# Patient Record
Sex: FEMALE | Race: WHITE | NOT HISPANIC OR LATINO | URBAN - METROPOLITAN AREA
[De-identification: names, ages, dates, MRNs, and addresses within clinical notes are randomized per-mention and may not be internally consistent; named-entity substitution may affect disease eponyms.]

---

## 2017-04-06 ENCOUNTER — GENERIC CONVERSION - ENCOUNTER (OUTPATIENT)
Dept: OTHER | Facility: OTHER | Age: 72
End: 2017-04-06

## 2017-06-15 ENCOUNTER — GENERIC CONVERSION - ENCOUNTER (OUTPATIENT)
Dept: OTHER | Facility: OTHER | Age: 72
End: 2017-06-15

## 2017-08-17 ENCOUNTER — RX ONLY (RX ONLY)
Age: 72
End: 2017-08-17

## 2017-08-17 ENCOUNTER — DOCTOR'S OFFICE (OUTPATIENT)
Dept: URBAN - METROPOLITAN AREA CLINIC 136 | Facility: CLINIC | Age: 72
Setting detail: OPHTHALMOLOGY
End: 2017-08-17
Payer: COMMERCIAL

## 2017-08-17 DIAGNOSIS — H01.001: ICD-10-CM

## 2017-08-17 DIAGNOSIS — H25.12: ICD-10-CM

## 2017-08-17 DIAGNOSIS — H10.13: ICD-10-CM

## 2017-08-17 DIAGNOSIS — H01.004: ICD-10-CM

## 2017-08-17 DIAGNOSIS — H16.221: ICD-10-CM

## 2017-08-17 DIAGNOSIS — H04.222: ICD-10-CM

## 2017-08-17 DIAGNOSIS — H16.222: ICD-10-CM

## 2017-08-17 PROCEDURE — 99203 OFFICE O/P NEW LOW 30 MIN: CPT | Performed by: OPHTHALMOLOGY

## 2017-08-17 ASSESSMENT — REFRACTION_MANIFEST
OS_VA1: 20/
OD_VA3: 20/
OD_VA2: 20/
OD_VA1: 20/
OS_VA2: 20/
OD_VA2: 20/
OD_VA3: 20/
OS_VA1: 20/
OD_VA1: 20/
OS_VA1: 20/
OS_VA3: 20/
OS_VA2: 20/
OU_VA: 20/
OS_VA2: 20/
OS_VA3: 20/
OU_VA: 20/
OD_VA2: 20/
OS_VA3: 20/
OD_VA1: 20/
OU_VA: 20/
OD_VA3: 20/

## 2017-08-17 ASSESSMENT — LID EXAM ASSESSMENTS
OS_BLEPHARITIS: 1+
OD_BLEPHARITIS: 1+

## 2017-08-17 ASSESSMENT — REFRACTION_CURRENTRX
OD_OVR_VA: 20/
OD_VPRISM_DIRECTION: PROGS
OS_CYLINDER: -0.50
OD_OVR_VA: 20/
OD_ADD: +2.25
OS_VPRISM_DIRECTION: PROGS
OS_AXIS: 118
OS_ADD: +2.25
OS_OVR_VA: 20/
OD_AXIS: 082
OD_CYLINDER: -0.50
OS_OVR_VA: 20/
OD_SPHERE: -0.50
OS_OVR_VA: 20/
OD_OVR_VA: 20/
OS_SPHERE: -1.00

## 2017-08-17 ASSESSMENT — VISUAL ACUITY
OS_BCVA: 20/30-1
OD_BCVA: 20/30-1

## 2017-08-17 ASSESSMENT — SUPERFICIAL PUNCTATE KERATITIS (SPK)
OD_SPK: ABSENT
OS_SPK: T

## 2017-08-17 ASSESSMENT — CONFRONTATIONAL VISUAL FIELD TEST (CVF)
OS_FINDINGS: FULL
OD_FINDINGS: FULL

## 2017-08-17 ASSESSMENT — INCREASING TEAR LAKE - SEVERITY SCORE: OS_INC_TEARLAKE: 1+

## 2017-09-05 ENCOUNTER — AMBUL SURGICAL CARE (OUTPATIENT)
Dept: URBAN - METROPOLITAN AREA SURGERY 32 | Facility: SURGERY | Age: 72
Setting detail: OPHTHALMOLOGY
End: 2017-09-05
Payer: COMMERCIAL

## 2017-09-05 DIAGNOSIS — H04.212: ICD-10-CM

## 2017-09-05 PROCEDURE — G8907 PT DOC NO EVENTS ON DISCHARG: HCPCS | Performed by: OPHTHALMOLOGY

## 2017-09-05 PROCEDURE — 68840 EXPLORE/IRRIGATE TEAR DUCTS: CPT | Performed by: OPHTHALMOLOGY

## 2017-09-05 PROCEDURE — G8918 PT W/O PREOP ORDER IV AB PRO: HCPCS | Performed by: OPHTHALMOLOGY

## 2017-09-05 PROCEDURE — 68810 PROBE NASOLACRIMAL DUCT: CPT | Performed by: OPHTHALMOLOGY

## 2017-09-12 ENCOUNTER — RX ONLY (RX ONLY)
Age: 72
End: 2017-09-12

## 2017-09-12 ENCOUNTER — DOCTOR'S OFFICE (OUTPATIENT)
Dept: URBAN - METROPOLITAN AREA CLINIC 136 | Facility: CLINIC | Age: 72
Setting detail: OPHTHALMOLOGY
End: 2017-09-12
Payer: COMMERCIAL

## 2017-09-12 DIAGNOSIS — Z96.1: ICD-10-CM

## 2017-09-12 DIAGNOSIS — H16.221: ICD-10-CM

## 2017-09-12 DIAGNOSIS — H04.222: ICD-10-CM

## 2017-09-12 DIAGNOSIS — H25.012: ICD-10-CM

## 2017-09-12 DIAGNOSIS — H01.001: ICD-10-CM

## 2017-09-12 DIAGNOSIS — H10.13: ICD-10-CM

## 2017-09-12 DIAGNOSIS — H26.40: ICD-10-CM

## 2017-09-12 DIAGNOSIS — H01.004: ICD-10-CM

## 2017-09-12 DIAGNOSIS — H16.222: ICD-10-CM

## 2017-09-12 PROCEDURE — 92014 COMPRE OPH EXAM EST PT 1/>: CPT | Performed by: OPHTHALMOLOGY

## 2017-09-12 ASSESSMENT — CONFRONTATIONAL VISUAL FIELD TEST (CVF)
OS_FINDINGS: FULL
OD_FINDINGS: FULL

## 2017-09-12 ASSESSMENT — SUPERFICIAL PUNCTATE KERATITIS (SPK)
OS_SPK: T
OD_SPK: ABSENT

## 2017-09-12 ASSESSMENT — LID EXAM ASSESSMENTS
OD_BLEPHARITIS: 1+
OS_BLEPHARITIS: 1+

## 2017-09-12 ASSESSMENT — INCREASING TEAR LAKE - SEVERITY SCORE: OS_INC_TEARLAKE: 1+

## 2017-09-13 ASSESSMENT — REFRACTION_MANIFEST
OS_VA3: 20/
OD_VA2: 20/
OS_VA3: 20/
OS_VA1: 20/
OS_VA2: 20/
OU_VA: 20/
OU_VA: 20/
OS_VA3: 20/
OS_VA1: 20/
OD_VA1: 20/
OD_VA3: 20/
OS_VA1: 20/
OD_VA2: 20/
OD_VA3: 20/
OU_VA: 20/
OS_VA2: 20/
OS_VA2: 20/
OD_VA3: 20/
OD_VA1: 20/
OD_VA1: 20/
OD_VA2: 20/

## 2017-09-13 ASSESSMENT — KERATOMETRY
OD_K1POWER_DIOPTERS: 45.25
OS_K2POWER_DIOPTERS: 45.00
OD_K2POWER_DIOPTERS: 44.75
OD_AXISANGLE_DEGREES: 174
OS_K1POWER_DIOPTERS: 45.00
OS_AXISANGLE_DEGREES: 169

## 2017-09-13 ASSESSMENT — REFRACTION_AUTOREFRACTION
OS_SPHERE: -2.25
OD_CYLINDER: -2.00
OS_AXIS: 155
OS_CYLINDER: -0.50
OD_AXIS: 081
OD_SPHERE: +0.22

## 2017-09-13 ASSESSMENT — SPHEQUIV_DERIVED
OD_SPHEQUIV: -0.78
OS_SPHEQUIV: -2.5

## 2017-09-13 ASSESSMENT — REFRACTION_CURRENTRX
OS_VPRISM_DIRECTION: PROGS
OD_AXIS: 082
OD_SPHERE: -0.50
OS_OVR_VA: 20/
OS_OVR_VA: 20/
OS_AXIS: 118
OD_OVR_VA: 20/
OS_SPHERE: -1.00
OD_CYLINDER: -0.50
OD_OVR_VA: 20/
OD_VPRISM_DIRECTION: PROGS
OS_CYLINDER: -0.50
OD_OVR_VA: 20/
OS_OVR_VA: 20/
OD_ADD: +2.25
OS_ADD: +2.25

## 2017-09-13 ASSESSMENT — AXIALLENGTH_DERIVED
OD_AL: 23.3474
OS_AL: 24.0227

## 2017-09-13 ASSESSMENT — VISUAL ACUITY
OS_BCVA: 20/25-1
OD_BCVA: 20/40

## 2017-09-21 ENCOUNTER — DOCTOR'S OFFICE (OUTPATIENT)
Dept: URBAN - METROPOLITAN AREA CLINIC 136 | Facility: CLINIC | Age: 72
Setting detail: OPHTHALMOLOGY
End: 2017-09-21
Payer: COMMERCIAL

## 2017-09-21 ENCOUNTER — RX ONLY (RX ONLY)
Age: 72
End: 2017-09-21

## 2017-09-21 DIAGNOSIS — H01.004: ICD-10-CM

## 2017-09-21 DIAGNOSIS — H01.001: ICD-10-CM

## 2017-09-21 DIAGNOSIS — H16.223: ICD-10-CM

## 2017-09-21 DIAGNOSIS — H25.012: ICD-10-CM

## 2017-09-21 DIAGNOSIS — H04.222: ICD-10-CM

## 2017-09-21 DIAGNOSIS — H10.13: ICD-10-CM

## 2017-09-21 PROCEDURE — 92012 INTRM OPH EXAM EST PATIENT: CPT | Performed by: OPHTHALMOLOGY

## 2017-09-21 ASSESSMENT — REFRACTION_MANIFEST
OU_VA: 20/
OS_VA2: 20/
OS_VA1: 20/
OD_VA2: 20/
OD_VA2: 20/
OS_VA2: 20/
OD_VA1: 20/
OD_VA1: 20/
OU_VA: 20/
OS_VA1: 20/
OD_VA3: 20/
OS_VA1: 20/
OD_VA2: 20/
OU_VA: 20/
OD_VA1: 20/
OS_VA3: 20/
OS_VA2: 20/
OD_VA3: 20/
OS_VA3: 20/
OD_VA3: 20/
OS_VA3: 20/

## 2017-09-21 ASSESSMENT — SPHEQUIV_DERIVED
OD_SPHEQUIV: -0.78
OS_SPHEQUIV: -2.5

## 2017-09-21 ASSESSMENT — REFRACTION_CURRENTRX
OS_OVR_VA: 20/
OD_OVR_VA: 20/
OD_OVR_VA: 20/
OS_OVR_VA: 20/
OD_OVR_VA: 20/
OS_OVR_VA: 20/

## 2017-09-21 ASSESSMENT — LID EXAM ASSESSMENTS
OD_MEIBOMITIS: 1+
OD_BLEPHARITIS: 1+
OS_BLEPHARITIS: 1+
OS_MEIBOMITIS: 1+

## 2017-09-21 ASSESSMENT — INCREASING TEAR LAKE - SEVERITY SCORE
OS_INC_TEARLAKE: ABSENT
OD_INC_TEARLAKE: ABSENT

## 2017-09-21 ASSESSMENT — CONFRONTATIONAL VISUAL FIELD TEST (CVF)
OD_FINDINGS: FULL
OS_FINDINGS: FULL

## 2017-09-21 ASSESSMENT — REFRACTION_AUTOREFRACTION
OD_AXIS: 081
OS_CYLINDER: -0.50
OD_SPHERE: +0.22
OD_CYLINDER: -2.00
OS_SPHERE: -2.25
OS_AXIS: 155

## 2017-09-21 ASSESSMENT — SUPERFICIAL PUNCTATE KERATITIS (SPK)
OS_SPK: ABSENT
OD_SPK: ABSENT

## 2017-09-21 ASSESSMENT — VISUAL ACUITY
OD_BCVA: 20/40
OS_BCVA: 20/25-1

## 2017-09-27 ENCOUNTER — DOCTOR'S OFFICE (OUTPATIENT)
Dept: URBAN - METROPOLITAN AREA CLINIC 136 | Facility: CLINIC | Age: 72
Setting detail: OPHTHALMOLOGY
End: 2017-09-27
Payer: COMMERCIAL

## 2017-09-27 DIAGNOSIS — H25.012: ICD-10-CM

## 2017-09-27 PROCEDURE — 92136 OPHTHALMIC BIOMETRY: CPT | Performed by: OPHTHALMOLOGY

## 2017-10-26 ENCOUNTER — DOCTOR'S OFFICE (OUTPATIENT)
Dept: URBAN - METROPOLITAN AREA CLINIC 136 | Facility: CLINIC | Age: 72
Setting detail: OPHTHALMOLOGY
End: 2017-10-26
Payer: COMMERCIAL

## 2017-10-26 DIAGNOSIS — H01.001: ICD-10-CM

## 2017-10-26 DIAGNOSIS — H25.012: ICD-10-CM

## 2017-10-26 DIAGNOSIS — H10.13: ICD-10-CM

## 2017-10-26 DIAGNOSIS — H16.223: ICD-10-CM

## 2017-10-26 DIAGNOSIS — H04.222: ICD-10-CM

## 2017-10-26 DIAGNOSIS — H01.004: ICD-10-CM

## 2017-10-26 PROCEDURE — 99214 OFFICE O/P EST MOD 30 MIN: CPT | Performed by: OPHTHALMOLOGY

## 2017-10-26 ASSESSMENT — CONFRONTATIONAL VISUAL FIELD TEST (CVF)
OD_FINDINGS: FULL
OS_FINDINGS: FULL

## 2017-10-26 ASSESSMENT — REFRACTION_MANIFEST
OD_VA2: 20/
OS_VA3: 20/
OD_VA3: 20/
OD_VA3: 20/
OU_VA: 20/
OS_VA1: 20/
OS_VA3: 20/
OD_VA2: 20/
OU_VA: 20/
OS_VA1: 20/
OS_VA1: 20/
OS_VA2: 20/
OS_VA3: 20/
OS_VA2: 20/
OD_VA2: 20/
OU_VA: 20/
OD_VA3: 20/
OD_VA1: 20/
OS_VA2: 20/
OD_VA1: 20/
OD_VA1: 20/

## 2017-10-26 ASSESSMENT — LID EXAM ASSESSMENTS
OS_COMMENTS: OD&GT
OS_BLEPHARITIS: 1+
OD_COMMENTS: OD&GT
OS_COMMENTS: OS
OD_BLEPHARITIS: 1+
OS_MEIBOMITIS: 1+
OD_COMMENTS: OS
OD_MEIBOMITIS: 1+

## 2017-10-26 ASSESSMENT — REFRACTION_CURRENTRX
OS_OVR_VA: 20/
OS_OVR_VA: 20/
OD_OVR_VA: 20/
OS_OVR_VA: 20/

## 2017-10-26 ASSESSMENT — REFRACTION_AUTOREFRACTION
OD_CYLINDER: -2.00
OS_CYLINDER: -0.50
OS_SPHERE: -2.25
OD_SPHERE: +0.22
OD_AXIS: 081
OS_AXIS: 155

## 2017-10-26 ASSESSMENT — SPHEQUIV_DERIVED
OS_SPHEQUIV: -2.5
OD_SPHEQUIV: -0.78

## 2017-10-26 ASSESSMENT — INCREASING TEAR LAKE - SEVERITY SCORE
OS_INC_TEARLAKE: T
OD_INC_TEARLAKE: T

## 2017-10-26 ASSESSMENT — VISUAL ACUITY
OD_BCVA: 20/40
OS_BCVA: 20/25-1

## 2017-10-26 ASSESSMENT — SUPERFICIAL PUNCTATE KERATITIS (SPK)
OD_SPK: 1+
OS_SPK: 1+

## 2017-11-01 ENCOUNTER — AMBUL SURGICAL CARE (OUTPATIENT)
Dept: URBAN - METROPOLITAN AREA SURGERY 32 | Facility: SURGERY | Age: 72
Setting detail: OPHTHALMOLOGY
End: 2017-11-01
Payer: COMMERCIAL

## 2017-11-01 DIAGNOSIS — H25.12: ICD-10-CM

## 2017-11-01 PROCEDURE — G8918 PT W/O PREOP ORDER IV AB PRO: HCPCS | Performed by: OPHTHALMOLOGY

## 2017-11-01 PROCEDURE — 66984 XCAPSL CTRC RMVL W/O ECP: CPT | Performed by: OPHTHALMOLOGY

## 2017-11-01 PROCEDURE — G8907 PT DOC NO EVENTS ON DISCHARG: HCPCS | Performed by: OPHTHALMOLOGY

## 2017-11-02 ENCOUNTER — RX ONLY (RX ONLY)
Age: 72
End: 2017-11-02

## 2017-11-02 ENCOUNTER — DOCTOR'S OFFICE (OUTPATIENT)
Dept: URBAN - METROPOLITAN AREA CLINIC 136 | Facility: CLINIC | Age: 72
Setting detail: OPHTHALMOLOGY
End: 2017-11-02
Payer: COMMERCIAL

## 2017-11-02 DIAGNOSIS — Z96.1: ICD-10-CM

## 2017-11-02 PROCEDURE — 99024 POSTOP FOLLOW-UP VISIT: CPT | Performed by: OPHTHALMOLOGY

## 2017-11-02 ASSESSMENT — KERATOMETRY
OD_K1POWER_DIOPTERS: 45.25
OS_AXISANGLE_DEGREES: 169
OD_K2POWER_DIOPTERS: 44.75
OS_K1POWER_DIOPTERS: 45.00
OS_K2POWER_DIOPTERS: 45.00
OD_AXISANGLE_DEGREES: 174

## 2017-11-02 ASSESSMENT — REFRACTION_MANIFEST
OS_VA3: 20/
OS_VA2: 20/
OD_VA1: 20/
OU_VA: 20/
OS_VA1: 20/
OD_VA2: 20/
OD_VA2: 20/
OD_VA3: 20/
OD_VA1: 20/
OS_VA2: 20/
OS_VA1: 20/
OS_VA1: 20/
OS_VA2: 20/
OD_VA3: 20/
OS_VA3: 20/
OD_VA1: 20/
OD_VA3: 20/
OD_VA2: 20/
OS_VA3: 20/

## 2017-11-02 ASSESSMENT — REFRACTION_AUTOREFRACTION
OS_SPHERE: -0.25
OS_AXIS: 121
OD_AXIS: 081
OD_CYLINDER: -2.25
OS_CYLINDER: -0.25
OD_SPHERE: +0.75

## 2017-11-02 ASSESSMENT — LID EXAM ASSESSMENTS
OD_BLEPHARITIS: 1+
OS_COMMENTS: OD&GT
OS_MEIBOMITIS: 1+
OD_COMMENTS: OD&GT
OD_COMMENTS: OS
OD_MEIBOMITIS: 1+
OS_COMMENTS: OS
OS_BLEPHARITIS: 1+

## 2017-11-02 ASSESSMENT — SUPERFICIAL PUNCTATE KERATITIS (SPK)
OD_SPK: 1+
OS_SPK: 1+

## 2017-11-02 ASSESSMENT — REFRACTION_CURRENTRX
OD_OVR_VA: 20/
OD_OVR_VA: 20/
OS_OVR_VA: 20/
OS_OVR_VA: 20/
OD_OVR_VA: 20/
OS_OVR_VA: 20/

## 2017-11-02 ASSESSMENT — AXIALLENGTH_DERIVED
OS_AL: 23.1939
OD_AL: 23.1939

## 2017-11-02 ASSESSMENT — INCREASING TEAR LAKE - SEVERITY SCORE
OD_INC_TEARLAKE: T
OS_INC_TEARLAKE: T

## 2017-11-02 ASSESSMENT — VISUAL ACUITY
OD_BCVA: 20/20-1
OS_BCVA: 20/25-1

## 2017-11-02 ASSESSMENT — SPHEQUIV_DERIVED
OD_SPHEQUIV: -0.375
OS_SPHEQUIV: -0.375

## 2017-11-08 ENCOUNTER — DOCTOR'S OFFICE (OUTPATIENT)
Dept: URBAN - METROPOLITAN AREA CLINIC 136 | Facility: CLINIC | Age: 72
Setting detail: OPHTHALMOLOGY
End: 2017-11-08

## 2017-11-08 DIAGNOSIS — Z96.1: ICD-10-CM

## 2017-11-08 DIAGNOSIS — H26.40: ICD-10-CM

## 2017-11-08 PROCEDURE — 99024 POSTOP FOLLOW-UP VISIT: CPT | Performed by: OPHTHALMOLOGY

## 2017-11-08 ASSESSMENT — KERATOMETRY
OD_AXISANGLE_DEGREES: 174
OS_K2POWER_DIOPTERS: 45.00
OS_AXISANGLE_DEGREES: 169
OS_K1POWER_DIOPTERS: 45.00
OD_K2POWER_DIOPTERS: 44.75
OD_K1POWER_DIOPTERS: 45.25

## 2017-11-08 ASSESSMENT — REFRACTION_AUTOREFRACTION
OS_CYLINDER: -0.50
OS_SPHERE: -0.50
OS_AXIS: 89
OD_CYLINDER: -2.75
OD_AXIS: 76
OD_SPHERE: +0.25

## 2017-11-08 ASSESSMENT — REFRACTION_MANIFEST
OD_VA1: 20/
OS_VA2: 20/
OD_VA1: 20/
OD_VA3: 20/
OD_VA1: 20/
OD_VA3: 20/
OS_VA1: 20/
OU_VA: 20/
OD_VA3: 20/
OD_VA2: 20/
OS_VA2: 20/
OS_VA1: 20/
OU_VA: 20/
OS_VA1: 20/
OD_VA2: 20/
OS_VA3: 20/
OU_VA: 20/
OS_VA2: 20/
OD_VA2: 20/
OS_VA3: 20/
OS_VA3: 20/

## 2017-11-08 ASSESSMENT — VISUAL ACUITY
OD_BCVA: 20/20-1
OS_BCVA: 20/25-2

## 2017-11-08 ASSESSMENT — INCREASING TEAR LAKE - SEVERITY SCORE
OS_INC_TEARLAKE: T
OD_INC_TEARLAKE: T

## 2017-11-08 ASSESSMENT — REFRACTION_CURRENTRX
OS_OVR_VA: 20/
OD_OVR_VA: 20/

## 2017-11-08 ASSESSMENT — SPHEQUIV_DERIVED
OD_SPHEQUIV: -1.125
OS_SPHEQUIV: -0.75

## 2017-11-08 ASSESSMENT — SUPERFICIAL PUNCTATE KERATITIS (SPK)
OS_SPK: 1+
OD_SPK: 1+

## 2017-11-08 ASSESSMENT — AXIALLENGTH_DERIVED
OS_AL: 23.336
OD_AL: 23.4798

## 2017-12-12 ENCOUNTER — DOCTOR'S OFFICE (OUTPATIENT)
Dept: URBAN - METROPOLITAN AREA CLINIC 136 | Facility: CLINIC | Age: 72
Setting detail: OPHTHALMOLOGY
End: 2017-12-12

## 2017-12-12 DIAGNOSIS — H26.40: ICD-10-CM

## 2017-12-12 DIAGNOSIS — Z96.1: ICD-10-CM

## 2017-12-12 PROCEDURE — 99024 POSTOP FOLLOW-UP VISIT: CPT | Performed by: OPHTHALMOLOGY

## 2017-12-12 ASSESSMENT — REFRACTION_CURRENTRX
OS_OVR_VA: 20/
OD_OVR_VA: 20/
OS_OVR_VA: 20/
OD_OVR_VA: 20/
OS_OVR_VA: 20/
OD_OVR_VA: 20/

## 2017-12-12 ASSESSMENT — SPHEQUIV_DERIVED
OD_SPHEQUIV: -0.375
OS_SPHEQUIV: -0.75

## 2017-12-12 ASSESSMENT — LID EXAM ASSESSMENTS
OS_BLEPHARITIS: 1+
OS_COMMENTS: OS
OS_COMMENTS: OD&GT
OD_COMMENTS: OD&GT
OD_MEIBOMITIS: 1+
OD_COMMENTS: OS
OD_BLEPHARITIS: 1+
OS_MEIBOMITIS: 1+

## 2017-12-12 ASSESSMENT — REFRACTION_MANIFEST
OS_VA3: 20/
OD_VA3: 20/
OS_VA3: 20/
OD_VA1: 20/
OD_VA3: 20/
OU_VA: 20/
OS_VA1: 20/
OS_VA2: 20/
OS_VA1: 20/
OD_VA2: 20/
OU_VA: 20/
OS_VA2: 20/
OS_VA3: 20/
OD_VA1: 20/
OS_VA2: 20/
OD_VA2: 20/
OD_VA1: 20/
OS_VA1: 20/
OD_VA3: 20/
OD_VA2: 20/
OU_VA: 20/

## 2017-12-12 ASSESSMENT — REFRACTION_AUTOREFRACTION
OD_AXIS: 083
OS_AXIS: 090
OD_SPHERE: +0.75
OD_CYLINDER: -2.25
OS_SPHERE: -0.50
OS_CYLINDER: -0.50

## 2017-12-12 ASSESSMENT — AXIALLENGTH_DERIVED
OS_AL: 23.336
OD_AL: 23.1939

## 2017-12-12 ASSESSMENT — INCREASING TEAR LAKE - SEVERITY SCORE
OS_INC_TEARLAKE: T
OD_INC_TEARLAKE: T

## 2017-12-12 ASSESSMENT — KERATOMETRY
OD_K2POWER_DIOPTERS: 44.75
OD_AXISANGLE_DEGREES: 174
OS_K1POWER_DIOPTERS: 45.00
OS_K2POWER_DIOPTERS: 45.00
OS_AXISANGLE_DEGREES: 169
OD_K1POWER_DIOPTERS: 45.25

## 2017-12-12 ASSESSMENT — VISUAL ACUITY
OS_BCVA: 20/40-1
OD_BCVA: 20/20-2

## 2017-12-12 ASSESSMENT — SUPERFICIAL PUNCTATE KERATITIS (SPK)
OS_SPK: 1+
OD_SPK: 1+

## 2017-12-12 ASSESSMENT — CONFRONTATIONAL VISUAL FIELD TEST (CVF)
OS_FINDINGS: FULL
OD_FINDINGS: FULL

## 2018-01-13 NOTE — RESULT NOTES
Message   thyroid function tests normal, low vitamin d - needs to take vitamin d3 5000 iu daily on a consistent basis      Verified Results  (1) VITAMIN D 25-HYDROXY 21Jan2016 04:50PM [facility]     Test Name Result Flag Reference   VIT D 25-HYDROX 16 6 ng/mL L 30 0-100 0     (1) T4, FREE 21Jan2016 04:50PM [facility]     Test Name Result Flag Reference   T4,FREE 1 02 ng/dL  0 76-1 46     (1) TSH [id number] 04:50PM [facility]   Patients undergoing fluorescein dye angiography may retain small amounts of fluorescein in the body for 48-72 hours post procedure  Samples containing fluorescein can produce falsely depressed TSH values  If the patient had this procedure,a specimen should be resubmitted post fluorescein clearance          The recommended reference ranges for TSH during pregnancy are as follows:  First trimester 0 1 to 2 5 uIU/mL  Second trimester  0 2 to 3 0 uIU/mL  Third trimester 0 3 to 3 0 uIU/m     Test Name Result Flag Reference   TSH 2 660 uIU/mL  0 358-3 740

## 2018-01-25 ENCOUNTER — AMBUL SURGICAL CARE (OUTPATIENT)
Dept: URBAN - METROPOLITAN AREA SURGERY 32 | Facility: SURGERY | Age: 73
Setting detail: OPHTHALMOLOGY
End: 2018-01-25
Payer: COMMERCIAL

## 2018-01-25 DIAGNOSIS — H26.491: ICD-10-CM

## 2018-01-25 PROCEDURE — G8907 PT DOC NO EVENTS ON DISCHARG: HCPCS | Performed by: OPHTHALMOLOGY

## 2018-01-25 PROCEDURE — 66821 AFTER CATARACT LASER SURGERY: CPT | Performed by: OPHTHALMOLOGY

## 2018-01-25 PROCEDURE — G8918 PT W/O PREOP ORDER IV AB PRO: HCPCS | Performed by: OPHTHALMOLOGY

## 2018-06-12 ENCOUNTER — DOCTOR'S OFFICE (OUTPATIENT)
Dept: URBAN - METROPOLITAN AREA CLINIC 136 | Facility: CLINIC | Age: 73
Setting detail: OPHTHALMOLOGY
End: 2018-06-12
Payer: COMMERCIAL

## 2018-06-12 DIAGNOSIS — H26.40: ICD-10-CM

## 2018-06-12 DIAGNOSIS — H01.004: ICD-10-CM

## 2018-06-12 DIAGNOSIS — H16.222: ICD-10-CM

## 2018-06-12 DIAGNOSIS — H01.002: ICD-10-CM

## 2018-06-12 DIAGNOSIS — H01.005: ICD-10-CM

## 2018-06-12 DIAGNOSIS — H16.221: ICD-10-CM

## 2018-06-12 DIAGNOSIS — Z96.1: ICD-10-CM

## 2018-06-12 DIAGNOSIS — H01.001: ICD-10-CM

## 2018-06-12 PROBLEM — H10.13 ALLERGIC CONJUNCTIVITS; BOTH EYES: Status: ACTIVE | Noted: 2017-08-17

## 2018-06-12 PROBLEM — H04.222: Status: ACTIVE | Noted: 2017-08-17

## 2018-06-12 PROCEDURE — 92014 COMPRE OPH EXAM EST PT 1/>: CPT | Performed by: OPHTHALMOLOGY

## 2018-06-12 ASSESSMENT — REFRACTION_MANIFEST
OS_VA1: 20/
OU_VA: 20/
OD_VA2: 20/
OS_VA3: 20/
OD_VA1: 20/
OD_VA1: 20/
OS_VA1: 20/
OS_VA3: 20/
OD_VA1: 20/
OD_VA3: 20/
OU_VA: 20/
OS_VA2: 20/
OS_VA2: 20/
OS_VA3: 20/
OD_VA3: 20/
OD_VA2: 20/
OS_VA1: 20/
OD_VA2: 20/
OU_VA: 20/
OD_VA3: 20/
OS_VA2: 20/

## 2018-06-12 ASSESSMENT — LID EXAM ASSESSMENTS
OD_BLEPHARITIS: 3+ 4+
OS_COMMENTS: OD&GT
OS_BLEPHARITIS: 3+ 4+
OD_COMMENTS: OS
OD_MEIBOMITIS: 1+
OS_MEIBOMITIS: 1+
OS_COMMENTS: OS
OD_COMMENTS: OD&GT

## 2018-06-12 ASSESSMENT — REFRACTION_CURRENTRX
OD_OVR_VA: 20/
OD_OVR_VA: 20/
OS_OVR_VA: 20/
OD_OVR_VA: 20/

## 2018-06-12 ASSESSMENT — VISUAL ACUITY
OS_BCVA: 20/25-1
OD_BCVA: 20/20

## 2018-06-12 ASSESSMENT — REFRACTION_AUTOREFRACTION
OD_CYLINDER: -2.25
OD_AXIS: 083
OD_SPHERE: +0.75
OS_CYLINDER: -0.50
OS_AXIS: 090
OS_SPHERE: -0.50

## 2018-06-12 ASSESSMENT — SPHEQUIV_DERIVED
OS_SPHEQUIV: -0.75
OD_SPHEQUIV: -0.375

## 2018-06-12 ASSESSMENT — INCREASING TEAR LAKE - SEVERITY SCORE
OD_INC_TEARLAKE: T
OS_INC_TEARLAKE: T

## 2018-06-12 ASSESSMENT — CONFRONTATIONAL VISUAL FIELD TEST (CVF)
OS_FINDINGS: FULL
OD_FINDINGS: FULL

## 2018-06-12 ASSESSMENT — SUPERFICIAL PUNCTATE KERATITIS (SPK)
OD_SPK: 1+
OS_SPK: 1+